# Patient Record
Sex: FEMALE | Race: WHITE | HISPANIC OR LATINO | Employment: UNEMPLOYED | URBAN - METROPOLITAN AREA
[De-identification: names, ages, dates, MRNs, and addresses within clinical notes are randomized per-mention and may not be internally consistent; named-entity substitution may affect disease eponyms.]

---

## 2023-09-29 PROBLEM — K44.9 HIATAL HERNIA: Status: ACTIVE | Noted: 2023-09-29

## 2023-09-29 PROBLEM — M25.561 KNEE PAIN, BILATERAL: Status: ACTIVE | Noted: 2023-09-29

## 2023-09-29 PROBLEM — R79.0 LOW FERRITIN: Status: ACTIVE | Noted: 2023-09-29

## 2023-09-29 PROBLEM — R07.81 RIB PAIN ON LEFT SIDE: Status: ACTIVE | Noted: 2023-09-29

## 2023-09-29 PROBLEM — H93.19 TINNITUS: Status: ACTIVE | Noted: 2023-09-29

## 2023-09-29 PROBLEM — J34.9 SINUS DISEASE: Status: ACTIVE | Noted: 2023-09-29

## 2023-09-29 PROBLEM — M54.16 LUMBAR RADICULOPATHY: Status: ACTIVE | Noted: 2023-09-29

## 2023-09-29 PROBLEM — K59.00 CONSTIPATION: Status: ACTIVE | Noted: 2023-09-29

## 2023-09-29 PROBLEM — T14.90XA TRAUMA: Status: ACTIVE | Noted: 2023-09-29

## 2023-09-29 PROBLEM — R76.8 HELICOBACTER PYLORI AB+: Status: ACTIVE | Noted: 2023-09-29

## 2023-09-29 PROBLEM — M54.50 LOW BACK PAIN: Status: ACTIVE | Noted: 2023-09-29

## 2023-09-29 PROBLEM — R73.9 HYPERGLYCEMIA: Status: ACTIVE | Noted: 2023-09-29

## 2023-09-29 PROBLEM — F41.8 MIXED ANXIETY DEPRESSIVE DISORDER: Status: ACTIVE | Noted: 2023-09-29

## 2023-09-29 PROBLEM — R90.89 ABNORMAL CT OF BRAIN: Status: ACTIVE | Noted: 2023-09-29

## 2023-09-29 PROBLEM — T78.40XA ALLERGIES: Status: ACTIVE | Noted: 2023-09-29

## 2023-09-29 PROBLEM — E53.8 VITAMIN B12 DEFICIENCY: Status: ACTIVE | Noted: 2023-09-29

## 2023-09-29 PROBLEM — R20.0 NUMBNESS AND TINGLING: Status: ACTIVE | Noted: 2023-09-29

## 2023-09-29 PROBLEM — R20.2 NUMBNESS AND TINGLING: Status: ACTIVE | Noted: 2023-09-29

## 2023-09-29 PROBLEM — M25.562 KNEE PAIN, BILATERAL: Status: ACTIVE | Noted: 2023-09-29

## 2023-09-29 PROBLEM — J45.909 ASTHMA (HHS-HCC): Status: ACTIVE | Noted: 2023-09-29

## 2023-09-29 PROBLEM — K52.9 COLITIS: Status: ACTIVE | Noted: 2023-09-29

## 2023-09-29 PROBLEM — Z86.16 HISTORY OF COVID-19: Status: ACTIVE | Noted: 2023-09-29

## 2023-09-29 PROBLEM — G43.909 MIGRAINES: Status: ACTIVE | Noted: 2023-09-29

## 2023-09-29 PROBLEM — R42 VERTIGO: Status: ACTIVE | Noted: 2023-09-29

## 2023-09-29 PROBLEM — M25.512 PAIN IN JOINT OF LEFT SHOULDER: Status: ACTIVE | Noted: 2023-09-29

## 2023-09-29 PROBLEM — C64.9 RENAL CELL CARCINOMA (MULTI): Status: ACTIVE | Noted: 2023-09-29

## 2023-09-29 PROBLEM — L65.9 HAIR LOSS: Status: ACTIVE | Noted: 2023-09-29

## 2023-09-29 RX ORDER — ALBUTEROL SULFATE 0.83 MG/ML
2.5 SOLUTION RESPIRATORY (INHALATION) EVERY 4 HOURS PRN
COMMUNITY
Start: 2022-03-31

## 2023-09-29 RX ORDER — CETIRIZINE HYDROCHLORIDE 10 MG/1
1 TABLET ORAL NIGHTLY PRN
COMMUNITY

## 2023-09-29 RX ORDER — PEN NEEDLE, DIABETIC 31 GX5/16"
NEEDLE, DISPOSABLE MISCELLANEOUS
COMMUNITY
Start: 2023-08-14 | End: 2024-05-13 | Stop reason: WASHOUT

## 2023-09-29 RX ORDER — IBUPROFEN 600 MG/1
1 TABLET ORAL EVERY 8 HOURS PRN
COMMUNITY
Start: 2022-03-31

## 2023-09-29 RX ORDER — CYCLOBENZAPRINE HCL 10 MG
1 TABLET ORAL NIGHTLY PRN
COMMUNITY

## 2023-09-29 RX ORDER — ALBUTEROL SULFATE 90 UG/1
2 AEROSOL, METERED RESPIRATORY (INHALATION) EVERY 4 HOURS PRN
COMMUNITY

## 2023-09-29 RX ORDER — ONDANSETRON 4 MG/1
1 TABLET, FILM COATED ORAL EVERY 8 HOURS PRN
COMMUNITY

## 2023-09-29 RX ORDER — CHOLECALCIFEROL (VITAMIN D3) 50 MCG
1 TABLET ORAL
COMMUNITY

## 2023-09-29 RX ORDER — POLYETHYLENE GLYCOL 3350 17 G/17G
17 POWDER, FOR SOLUTION ORAL
COMMUNITY
Start: 2022-12-07 | End: 2024-05-13 | Stop reason: WASHOUT

## 2023-10-02 ENCOUNTER — TELEMEDICINE (OUTPATIENT)
Dept: PRIMARY CARE | Facility: CLINIC | Age: 58
End: 2023-10-02
Payer: MEDICAID

## 2023-10-02 ENCOUNTER — TELEPHONE (OUTPATIENT)
Dept: PRIMARY CARE | Facility: CLINIC | Age: 58
End: 2023-10-02

## 2023-10-02 DIAGNOSIS — Z12.31 ENCOUNTER FOR SCREENING MAMMOGRAM FOR BREAST CANCER: ICD-10-CM

## 2023-10-02 DIAGNOSIS — Z12.31 SCREENING MAMMOGRAM FOR BREAST CANCER: ICD-10-CM

## 2023-10-02 DIAGNOSIS — I10 HIGH BLOOD PRESSURE DETERMINED BY EXAMINATION: Primary | ICD-10-CM

## 2023-10-02 PROCEDURE — 99214 OFFICE O/P EST MOD 30 MIN: CPT | Performed by: INTERNAL MEDICINE

## 2023-10-02 RX ORDER — GLUCOSAM/CHONDRO/HERB 149/HYAL 750-100 MG
1 TABLET ORAL DAILY
COMMUNITY
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
## 2023-10-03 ENCOUNTER — TELEPHONE (OUTPATIENT)
Dept: PRIMARY CARE | Facility: CLINIC | Age: 58
End: 2023-10-03
Payer: MEDICAID

## 2023-10-03 ENCOUNTER — ANCILLARY PROCEDURE (OUTPATIENT)
Dept: RADIOLOGY | Facility: CLINIC | Age: 58
End: 2023-10-03
Payer: MEDICAID

## 2023-10-03 DIAGNOSIS — M54.6 PAIN IN THORACIC SPINE: ICD-10-CM

## 2023-10-03 DIAGNOSIS — R61 NIGHT SWEATS: Primary | ICD-10-CM

## 2023-10-03 PROCEDURE — 72146 MRI CHEST SPINE W/O DYE: CPT

## 2023-10-03 PROCEDURE — 72146 MRI CHEST SPINE W/O DYE: CPT | Performed by: RADIOLOGY

## 2023-10-03 NOTE — TELEPHONE ENCOUNTER
"Pt called and states \"I forgot to ask Dr. Ann in my virtual appointment.  I wake up sweating every night, I get completely soaked.  I am wondering what this would be from and should I be concerned?\"  "

## 2023-10-04 ENCOUNTER — OFFICE VISIT (OUTPATIENT)
Dept: SURGERY | Facility: CLINIC | Age: 58
End: 2023-10-04
Payer: MEDICAID

## 2023-10-04 ENCOUNTER — TELEPHONE (OUTPATIENT)
Dept: PRIMARY CARE | Facility: CLINIC | Age: 58
End: 2023-10-04

## 2023-10-04 DIAGNOSIS — R59.9 LYMPH NODE ENLARGEMENT: ICD-10-CM

## 2023-10-04 DIAGNOSIS — L04.9 LYMPH NODE ABSCESS: Primary | ICD-10-CM

## 2023-10-04 PROCEDURE — 1036F TOBACCO NON-USER: CPT | Performed by: PHYSICIAN ASSISTANT

## 2023-10-04 PROCEDURE — 99203 OFFICE O/P NEW LOW 30 MIN: CPT | Performed by: PHYSICIAN ASSISTANT

## 2023-10-04 NOTE — PROGRESS NOTES
Subjective   Patient ID: Tyra Hogan is a 58 y.o. female who presents for knot on neck.  HPI 58-year-old female with a recent diagnosis couple of months ago with renal cell cancer.  At [facility].  She saw her primary care doctor for this knot on the back of the neck and they told her it was a lipoma and they sent her here to have it extracted.  She states its been there for she is not sure how long she has been putting castor oil on it she thinks it is gotten smaller.  She has a an appointment with her urology person next week to discuss surgical options for her renal cell cancer.  She is not seen an oncologist yet.  But there is just a solitary posterior neck not according to the patient and it does not hurt.    Review of Systems  Negative other than mentioned in HPI    ENT: No earache, no sore throat, no nosebleeds  Cardiovascular: No chest pain, no shortness of breath, no leg pain, no edema  Respiratory: No shortness of breath on exertion, no wheezing  Gastrointestinal: No abdominal pain, no melena, no nausea, vomiting and/or diarrhea  Musculoskeletal: No pain moving all extremities, no back pain ambulating normally  Skin: No rashes, no lesions, and no skin changes  Neuro: No headache, no confusion, no numbness and tingling  Psychiatric, normal mood, not suicidal, not homicidal, feeling good    Objective There were no vitals taken for this visit.    Physical Exam  Eyes: Conjunctiva non -icteric and eye lids are without obvious rash or drooping. Pupils are symmetric.   Ears, Nose, Mouth, and Throat: External ears and nose appear to be without deformity or rash. No lesions or masses noted. Hearing is grossly intact.   Neck:. No JVD noted, tracheal position is midline. No thyromegaly, no thyroid nodules, there is 1 most likely posterior cervical chain lymph node present soft movable.  It is only about 1 cm  Head and Face: Examination of the head and face revealed no abnormalities.   Respiratory: No gasping or  shortness of breath noted, no use of accessory muscles noted. Clear to auscultate bilaterally  Cardiovascular: Examination for edema is normal. Regular rate and rhythm S1 S2 without murmurs  GI: Abdomen no tender to palpation, bowel sounds present no hepatosplenomegaly  Skin: No anterior cervical chain lymph nodes, no supra or infra clavicular lymph nodes.  No axillary adenopathy present.  Just the one small posterior cervical chain lymph node on the left side.  Musk: Digits/nails show no clubbing or cyanosis. No asymmetry or masses noted of the musculature. Examination of the muscles/joints/bones show normal range of motion. Gait is grossly normally.   Neurologic: Cranial nerves II- XII intact, motor strength 5/5 muscle strength of the lower extremities bilaterally and equal.      Assessment/Plan   58-year-old female with most likely a posterior cervical chain lymph node present its only about 1 cm small is movable.  Patient was encouraged just to watch it.  She should also report this to her urologist who is covering her renal cell cancer.  If it gets larger or becomes painful please return to the office.

## 2023-10-20 LAB
NON-UH HIE A/G RATIO: 1.5
NON-UH HIE ALB: 4 G/DL (ref 3.4–5)
NON-UH HIE ALK PHOS: 73 UNIT/L (ref 45–117)
NON-UH HIE BILIRUBIN, TOTAL: 0.8 MG/DL (ref 0.3–1.2)
NON-UH HIE BUN/CREAT RATIO: 20
NON-UH HIE BUN: 12 MG/DL (ref 9–23)
NON-UH HIE CALCIUM: 9.3 MG/DL (ref 8.7–10.4)
NON-UH HIE CALCULATED OSMOLALITY: 279 MOSM/KG (ref 275–295)
NON-UH HIE CHLORIDE: 105 MMOL/L (ref 98–107)
NON-UH HIE CO2, VENOUS: 31 MMOL/L (ref 20–31)
NON-UH HIE CREATININE: 0.6 MG/DL (ref 0.5–0.8)
NON-UH HIE FREE T3: 2.7 PG/ML (ref 2.3–4.2)
NON-UH HIE FREE T4: 1 NG/DL (ref 0.89–1.76)
NON-UH HIE GFR AA: >60
NON-UH HIE GLOBULIN: 2.6 G/DL
NON-UH HIE GLOMERULAR FILTRATION RATE: >60 ML/MIN/1.73M?
NON-UH HIE GLUCOSE: 89 MG/DL (ref 74–106)
NON-UH HIE GOT: <8 UNIT/L (ref 15–37)
NON-UH HIE GPT: 8 UNIT/L (ref 10–49)
NON-UH HIE K: 4 MMOL/L (ref 3.5–5.1)
NON-UH HIE NA: 140 MMOL/L (ref 135–145)
NON-UH HIE TOTAL PROTEIN: 6.6 G/DL (ref 5.7–8.2)
NON-UH HIE TSH: 2.22 UIU/ML (ref 0.55–4.78)

## 2023-10-31 ENCOUNTER — OFFICE VISIT (OUTPATIENT)
Dept: PRIMARY CARE | Facility: CLINIC | Age: 58
End: 2023-10-31
Payer: MEDICAID

## 2023-10-31 VITALS
DIASTOLIC BLOOD PRESSURE: 88 MMHG | OXYGEN SATURATION: 98 % | HEART RATE: 80 BPM | SYSTOLIC BLOOD PRESSURE: 120 MMHG | TEMPERATURE: 97.8 F | WEIGHT: 130 LBS | BODY MASS INDEX: 21.66 KG/M2 | HEIGHT: 65 IN

## 2023-10-31 DIAGNOSIS — C64.1 RENAL CELL CARCINOMA OF RIGHT KIDNEY (MULTI): Primary | ICD-10-CM

## 2023-10-31 PROBLEM — Z86.16 HISTORY OF COVID-19: Status: RESOLVED | Noted: 2023-09-29 | Resolved: 2023-10-31

## 2023-10-31 PROBLEM — I10 HIGH BLOOD PRESSURE DETERMINED BY EXAMINATION: Status: RESOLVED | Noted: 2023-10-02 | Resolved: 2023-10-31

## 2023-10-31 PROBLEM — K52.9 COLITIS: Status: RESOLVED | Noted: 2023-09-29 | Resolved: 2023-10-31

## 2023-10-31 PROCEDURE — 99214 OFFICE O/P EST MOD 30 MIN: CPT | Performed by: INTERNAL MEDICINE

## 2023-10-31 PROCEDURE — 1036F TOBACCO NON-USER: CPT | Performed by: INTERNAL MEDICINE

## 2023-10-31 ASSESSMENT — PATIENT HEALTH QUESTIONNAIRE - PHQ9
1. LITTLE INTEREST OR PLEASURE IN DOING THINGS: NOT AT ALL
SUM OF ALL RESPONSES TO PHQ9 QUESTIONS 1 & 2: 0
2. FEELING DOWN, DEPRESSED OR HOPELESS: NOT AT ALL

## 2023-10-31 ASSESSMENT — PAIN SCALES - GENERAL: PAINLEVEL: 0-NO PAIN

## 2023-10-31 NOTE — PROGRESS NOTES
"Subjective   Patient ID: Tyra Hogan is a 58 y.o. female who presents for Follow-up (Pt here for FUV to discuss results of MRI and lab results.  Pt would like to discuss her blood pressure and vision disturbances.).    HPI   Last visit 10/2023 she was concerned about bp  Now changes in vision. Intermittent. Last ophtho 1 month ago but was not having vision symptoms when she saw ophtho.   Few years ago diagnosed with ocular migraines  Having numbness right side of face  Bilateral feet are cold. Numbness.   Also numbness of arms.    Left sided pain resolved after doing colon prep. Had a lot of mucous when she did the prep. Still pain right side.  Has appt with urologist tomorrow. She will discuss CT chest with them tomorrow.  Friends told patient she was cured and that she should not have the surgery. The told her that if she believed in God she would wait a few more months so that she could see she was cured. She has not talked to these people for the last month. Her children have been encouraging her to proceed with surgery.    Chronic numbness tingling right leg.       Objective   /88 (BP Location: Right arm, Patient Position: Sitting)   Pulse 80   Temp 36.6 °C (97.8 °F)   Ht 1.651 m (5' 5\")   Wt 59 kg (130 lb)   SpO2 98%   BMI 21.63 kg/m²     Physical Exam  /70 on repeat regular cuff sitting  NAD    Vital reviewed  Neck: no cervical/clavicular adenopathy  CV: RRR S1 S2 normal. No murmur  Lungs: CTA without wrr. Breath sounds symmetric  Abdomen: normoactive. Soft, nontender. No mass.  Extremities: no pretibial edema  Neuro: speech intact.     Labs 10/2023 cmp, tsh  Tsh 2.22 normal  Cr 0.6 glucose 89 K 4    MRI thoracic spine midline disc herniation C6/7 with flattening thecal sac    Assessment/Plan .     Renal cancer: long discussion with patient. She is planning to have surgery end of this week.    Anxiety/depression. Has good support from children  Labs up to date.   "

## 2023-11-06 ENCOUNTER — TELEPHONE (OUTPATIENT)
Dept: PRIMARY CARE | Facility: CLINIC | Age: 58
End: 2023-11-06
Payer: MEDICAID

## 2023-11-06 NOTE — TELEPHONE ENCOUNTER
----- Message from Katie Chapa MD sent at 11/3/2023 10:06 AM EDT -----  Please let patient know mammogram is normal.

## 2023-11-20 ENCOUNTER — TELEPHONE (OUTPATIENT)
Dept: PRIMARY CARE | Facility: CLINIC | Age: 58
End: 2023-11-20
Payer: MEDICAID

## 2023-11-20 NOTE — LETTER
November 21, 2023     Tyra Hogan  [address]  [facility] [address]    Patient: Tyra Hogan   YOB: 1965   Date of Visit: 11/20/2023       Dear Ritika:    Tyra Hogan  is my patient, and has been under my care. I am intimately familiar with history and with the functional limitations imposed by her disability.  She meets the definition of disability under the Americans with Disabilities Act, the Fair Housing Act, and the Rehabilitation Act of 1973.    Due to their medical/mental disability, Tyra Hogan has certain limitations regarding adjustment disorder, anxiety and depression.   In order to help alleviate these difficulties, and to enhance her ability to live independently and to fully use and enjoy the dwelling you own and/or administer, I am prescribing an emotional support animal that will assist Tyra Hogan in coping with her disability.    I am familiar with the voluminous professional literature concerning the therapeutic benefits of assistance animals for people with disabilities such as that experienced by Tyra Hogan. Upon request, I will share citations to relevant studies, and would be happy to answer other questions you may have concerning my recommendation the Tyra Hogan have an emotional support animal. Should you have any additional questions, please do not hesitate to contact my office.    Sincerely,     Katie Ann MD   ______________________________________________________________________________________

## 2023-11-20 NOTE — TELEPHONE ENCOUNTER
"Pt left message \"I recently moved and I need a letter from Dr. Ann stating that I need my dog for emotional support.  I need it by the end of this week.\"  "

## 2023-11-27 ENCOUNTER — OFFICE VISIT (OUTPATIENT)
Dept: NEUROSURGERY | Facility: CLINIC | Age: 58
End: 2023-11-27
Payer: MEDICAID

## 2023-11-27 VITALS
WEIGHT: 130 LBS | TEMPERATURE: 96.9 F | HEIGHT: 65 IN | HEART RATE: 69 BPM | SYSTOLIC BLOOD PRESSURE: 114 MMHG | BODY MASS INDEX: 21.66 KG/M2 | DIASTOLIC BLOOD PRESSURE: 80 MMHG

## 2023-11-27 DIAGNOSIS — M54.12 CERVICAL RADICULOPATHY: Primary | ICD-10-CM

## 2023-11-27 PROCEDURE — 1036F TOBACCO NON-USER: CPT | Performed by: STUDENT IN AN ORGANIZED HEALTH CARE EDUCATION/TRAINING PROGRAM

## 2023-11-27 PROCEDURE — 99203 OFFICE O/P NEW LOW 30 MIN: CPT | Performed by: STUDENT IN AN ORGANIZED HEALTH CARE EDUCATION/TRAINING PROGRAM

## 2023-11-27 ASSESSMENT — PATIENT HEALTH QUESTIONNAIRE - PHQ9
1. LITTLE INTEREST OR PLEASURE IN DOING THINGS: NOT AT ALL
2. FEELING DOWN, DEPRESSED OR HOPELESS: NOT AT ALL
SUM OF ALL RESPONSES TO PHQ9 QUESTIONS 1 & 2: 0

## 2023-11-27 ASSESSMENT — LIFESTYLE VARIABLES
AUDIT-C TOTAL SCORE: 1
HOW MANY STANDARD DRINKS CONTAINING ALCOHOL DO YOU HAVE ON A TYPICAL DAY: 1 OR 2
HOW OFTEN DO YOU HAVE A DRINK CONTAINING ALCOHOL: MONTHLY OR LESS
HOW OFTEN DO YOU HAVE SIX OR MORE DRINKS ON ONE OCCASION: NEVER
SKIP TO QUESTIONS 9-10: 1

## 2023-11-27 ASSESSMENT — PAIN SCALES - GENERAL: PAINLEVEL: 0-NO PAIN

## 2023-11-27 NOTE — PROGRESS NOTES
[facility]  Department of Neurological Surgery  New Patient Visit    History of Present Illness:  Tyra Hogan is a 58 y.o. year old female who presents to the spine clinic with paraesthesias in arms, neck pain, and right sided scapular pain. The pain has been going on for months and has been worsening. Her symptoms are brought on with activity. She saw a naturopathic doctor who prescribed her anti-inflammatories. She saw improvement in her symptoms after taking medication. She now only has some intermittent pain in her neck, otherwise she is in good spirits today in the office. She has never had neck surgery. She has not seen pain management. She has done physical therapy.    Prior Spine Surgeries: None    Physical Therapy: Yes  Diabetic: No   Osteoporosis: No  Patient's BMI is Body mass index is 21.63 kg/m².    Review of Systems:   systems reviewed and negative other than what is listed in the history of present illness    Patient Active Problem List   Diagnosis    Abnormal CT of brain    Allergies    Asthma    Constipation    Hair loss    Helicobacter pylori ab+    Hiatal hernia    Hyperglycemia    Knee pain, bilateral    Low back pain    Low ferritin    Lumbar radiculopathy    Migraines    Tinnitus    Numbness and tingling    Vertigo    Sinus disease    Mixed anxiety depressive disorder    Pain in joint of left shoulder    Rib pain on left side    Trauma    Renal cell carcinoma (CMS/HCC)    Vitamin B12 deficiency     No past medical history on file.  Past Surgical History:   Procedure Laterality Date    APPENDECTOMY  2003     SECTION, LOW TRANSVERSE  3/2/1995    COLOSTOMY  2023    OTHER SURGICAL HISTORY  2021    Appendectomy    OTHER SURGICAL HISTORY  2021     section    OTHER SURGICAL HISTORY  2021    Tonsillectomy     Social History     Tobacco Use    Smoking status: Former     Packs/day: 0.25     Years: 15.00     Additional pack years:  0.00     Total pack years: 3.75     Types: Cigarettes     Quit date: 1994     Years since quittin.3    Smokeless tobacco: Never   Substance Use Topics    Alcohol use: Not Currently     family history includes Diabetes in her mother; Hypertension in her mother; Other in her father; cardiac disorder in her mother; cerebral vascular accident in her sister.    Current Outpatient Medications:     cholecalciferol (Vitamin D-3) 50 MCG (2000 UT) tablet, Take 1 tablet (2,000 Units) by mouth once daily., Disp: , Rfl:     mecobalamin (B12 ACTIVE ORAL), Take 1 tablet by mouth once daily., Disp: , Rfl:     albuterol (Ventolin HFA) 90 mcg/actuation inhaler, Inhale 2 puffs every 4 hours if needed for shortness of breath., Disp: , Rfl:     albuterol 2.5 mg /3 mL (0.083 %) nebulizer solution, Inhale 3 mL (2.5 mg) every 4 hours if needed for wheezing., Disp: , Rfl:     Bacillus coagulans (PROBIOTIC, B. COAGULANS, ORAL), Take 1 capsule by mouth if needed., Disp: , Rfl:     cetirizine (ZyrTEC) 10 mg tablet, Take 1 tablet (10 mg) by mouth as needed at bedtime., Disp: , Rfl:     cyclobenzaprine (Flexeril) 10 mg tablet, Take 1 tablet (10 mg) by mouth as needed at bedtime., Disp: , Rfl:     ibuprofen 600 mg tablet, Take 1 tablet (600 mg) by mouth every 8 hours if needed for headaches., Disp: , Rfl:     nebulizers misc, Use as directed, Disp: , Rfl:     omega 3-dha-epa-fish oil (Fish OiL) 1,000 mg (120 mg-180 mg) capsule, Take 1 capsule (1,000 mg) by mouth once daily., Disp: , Rfl:     ondansetron (Zofran) 4 mg tablet, Take 1 tablet (4 mg) by mouth every 8 hours if needed., Disp: , Rfl:     polyethylene glycol (Glycolax, Miralax) 17 gram/dose powder, Take 17 g by mouth. 1-2 times daily, Disp: , Rfl:     SHARK CARTILAGE ORAL, Take by mouth., Disp: , Rfl:     FAIZAN'S WORT ORAL, Take by mouth., Disp: , Rfl:   Allergies   Allergen Reactions    Gabapentin Anxiety, Palpitations, Shortness of breath and Other       Physical  Examination    General: Well developed, awake/alert/oriented x3, no distress, alert and cooperative  Skin: Warm and dry, no lesions, no rashes  ENMT: Mucous membranes moist, no apparent injury, no lesions seen  Head/Neck: Neck Supple, no apparent injury  Respiratory/Thorax: Normal breath sounds with good chest expansion, thorax symmetric  Cardiovascular: No pitting edema, no JVD    Motor Strength: 5/5 Throughout all extremities    Muscle Bulk: Normal and symmetric in all extremities    Posture:   -- Cervical: Normal  -- Thoracic: Normal  -- Lumbar : Normal  Paraspinal muscle spasm/tenderness absent.     Sensation: intact to light touch    Results    I personally reviewed and interpreted the imaging results which included MRI of thoracic spine showing C6-7 disc herniation causing moderate spinal stenosis.    Assessment and Plan:    Tyra Hogan is a 58 y.o. year old female who presents to the spine clinic with cervical radiculopathy. Her symptoms are well-controlled with medication and conservative care. She will continue with conservative management. Follow up as needed.      I have reviewed all prior documentation and reviewed the electronic medical record since admission. I have personally have reviewed all advanced imaging not just the reports and used my interpretation as documented as the relevant findings. I have reviewed the risks and benefits of all treatment recommendations listed in this note with the patient and family. I spent a total of 35 minutes in service to this patient's care during this date of service.    The above clinical summary has been dictated with voice recognition software. It has not been proofread for grammatical errors, typographical mistakes, or other semantic inconsistencies.    Thank you for visiting our office today. It was our pleasure to take part in your healthcare.     Do not hesitate to call with any questions regarding your plan of care after leaving at [phone] M-F  8am-4pm.     To clinicians, thank you very much for this kind referral. It is a privilege to partner with you in the care of your patients. My office would be delighted to assist you with any further consultations or with questions regarding the plan of care outlined. Do not hesitate to call the office or contact me directly.       Sincerely,      Robbie Veras MD  Director, [facility]   of Neurosurgery, [facility] and [facility]  Complex Spine Fellowship Director  , Department of Neurological Surgery  [employer] [facility] [employer]    [facility]  [address]  [place] [address]    [facility]  [address]  [address]  [place] [address]    Phone: [phone]  Fax: [phone]        Scribe Attestation  By signing my name below, I, Gissel Melissa , Scribe   attest that this documentation has been prepared under the direction and in the presence of Robbie Veras MD.

## 2024-05-09 PROBLEM — J01.90 ACUTE SINUSITIS: Status: ACTIVE | Noted: 2023-07-30

## 2024-05-09 PROBLEM — M51.34 THORACIC DEGENERATIVE DISC DISEASE: Status: ACTIVE | Noted: 2024-05-09

## 2024-05-09 PROBLEM — M62.81 MUSCLE WEAKNESS: Status: ACTIVE | Noted: 2022-10-05

## 2024-05-09 PROBLEM — M25.562 BILATERAL CHRONIC KNEE PAIN: Status: ACTIVE | Noted: 2022-10-05

## 2024-05-09 PROBLEM — M25.561 BILATERAL CHRONIC KNEE PAIN: Status: ACTIVE | Noted: 2022-10-05

## 2024-05-09 PROBLEM — R30.0 DYSURIA: Status: ACTIVE | Noted: 2023-07-30

## 2024-05-09 PROBLEM — R26.89 BALANCE PROBLEM: Status: ACTIVE | Noted: 2022-10-05

## 2024-05-09 PROBLEM — G89.29 BILATERAL CHRONIC KNEE PAIN: Status: ACTIVE | Noted: 2022-10-05

## 2024-05-12 PROBLEM — J01.90 ACUTE SINUSITIS: Status: RESOLVED | Noted: 2023-07-30 | Resolved: 2024-05-12

## 2024-05-12 PROBLEM — J34.9 SINUS DISEASE: Status: RESOLVED | Noted: 2023-09-29 | Resolved: 2024-05-12

## 2024-05-13 ENCOUNTER — OFFICE VISIT (OUTPATIENT)
Dept: PRIMARY CARE | Facility: CLINIC | Age: 59
End: 2024-05-13
Payer: MEDICAID

## 2024-05-13 ENCOUNTER — CLINICAL SUPPORT (OUTPATIENT)
Dept: ORTHOPEDIC SURGERY | Facility: CLINIC | Age: 59
End: 2024-05-13
Payer: MEDICAID

## 2024-05-13 ENCOUNTER — HOSPITAL ENCOUNTER (OUTPATIENT)
Dept: RADIOLOGY | Facility: CLINIC | Age: 59
Discharge: HOME | End: 2024-05-13
Payer: MEDICAID

## 2024-05-13 ENCOUNTER — LAB (OUTPATIENT)
Dept: LAB | Facility: LAB | Age: 59
End: 2024-05-13
Payer: MEDICAID

## 2024-05-13 VITALS
HEART RATE: 74 BPM | SYSTOLIC BLOOD PRESSURE: 110 MMHG | DIASTOLIC BLOOD PRESSURE: 78 MMHG | BODY MASS INDEX: 23.09 KG/M2 | WEIGHT: 138.6 LBS | OXYGEN SATURATION: 97 % | HEIGHT: 65 IN | TEMPERATURE: 98.2 F

## 2024-05-13 DIAGNOSIS — C64.9 RENAL CELL CARCINOMA, UNSPECIFIED LATERALITY (MULTI): ICD-10-CM

## 2024-05-13 DIAGNOSIS — M54.2 NECK PAIN: Primary | ICD-10-CM

## 2024-05-13 DIAGNOSIS — E53.8 VITAMIN B12 DEFICIENCY: ICD-10-CM

## 2024-05-13 DIAGNOSIS — M79.10 MUSCLE PAIN: ICD-10-CM

## 2024-05-13 DIAGNOSIS — M54.9 BACK PAIN, UNSPECIFIED BACK LOCATION, UNSPECIFIED BACK PAIN LATERALITY, UNSPECIFIED CHRONICITY: ICD-10-CM

## 2024-05-13 DIAGNOSIS — R32 URINARY INCONTINENCE, UNSPECIFIED TYPE: ICD-10-CM

## 2024-05-13 DIAGNOSIS — R53.83 OTHER FATIGUE: ICD-10-CM

## 2024-05-13 DIAGNOSIS — G47.8 UNREFRESHED BY SLEEP: ICD-10-CM

## 2024-05-13 DIAGNOSIS — M54.2 NECK PAIN: ICD-10-CM

## 2024-05-13 PROBLEM — L65.9 HAIR LOSS: Status: RESOLVED | Noted: 2023-09-29 | Resolved: 2024-05-13

## 2024-05-13 LAB
25(OH)D3 SERPL-MCNC: 70 NG/ML (ref 30–100)
ALBUMIN SERPL BCP-MCNC: 4.2 G/DL (ref 3.4–5)
ALP SERPL-CCNC: 60 U/L (ref 33–110)
ALT SERPL W P-5'-P-CCNC: 11 U/L (ref 7–45)
ANION GAP SERPL CALC-SCNC: 8 MMOL/L (ref 10–20)
APPEARANCE UR: CLEAR
AST SERPL W P-5'-P-CCNC: 9 U/L (ref 9–39)
BASOPHILS # BLD AUTO: 0.02 X10*3/UL (ref 0–0.1)
BASOPHILS NFR BLD AUTO: 0.4 %
BILIRUB SERPL-MCNC: 0.7 MG/DL (ref 0–1.2)
BILIRUB UR STRIP.AUTO-MCNC: NEGATIVE MG/DL
BUN SERPL-MCNC: 15 MG/DL (ref 6–23)
CALCIUM SERPL-MCNC: 9.3 MG/DL (ref 8.6–10.3)
CHLORIDE SERPL-SCNC: 104 MMOL/L (ref 98–107)
CO2 SERPL-SCNC: 32 MMOL/L (ref 21–32)
COLOR UR: YELLOW
CREAT SERPL-MCNC: 0.55 MG/DL (ref 0.5–1.05)
EGFRCR SERPLBLD CKD-EPI 2021: >90 ML/MIN/1.73M*2
EOSINOPHIL # BLD AUTO: 0.07 X10*3/UL (ref 0–0.7)
EOSINOPHIL NFR BLD AUTO: 1.4 %
ERYTHROCYTE [DISTWIDTH] IN BLOOD BY AUTOMATED COUNT: 12.6 % (ref 11.5–14.5)
FERRITIN SERPL-MCNC: 78 NG/ML (ref 8–150)
GLUCOSE SERPL-MCNC: 83 MG/DL (ref 74–99)
GLUCOSE UR STRIP.AUTO-MCNC: NEGATIVE MG/DL
HCT VFR BLD AUTO: 42.6 % (ref 36–46)
HGB BLD-MCNC: 14.6 G/DL (ref 12–16)
IMM GRANULOCYTES # BLD AUTO: 0.01 X10*3/UL (ref 0–0.7)
IMM GRANULOCYTES NFR BLD AUTO: 0.2 % (ref 0–0.9)
IRON SATN MFR SERPL: 28 % (ref 25–45)
IRON SERPL-MCNC: 93 UG/DL (ref 35–150)
KETONES UR STRIP.AUTO-MCNC: NEGATIVE MG/DL
LEUKOCYTE ESTERASE UR QL STRIP.AUTO: NEGATIVE
LYMPHOCYTES # BLD AUTO: 1.26 X10*3/UL (ref 1.2–4.8)
LYMPHOCYTES NFR BLD AUTO: 25.1 %
MCH RBC QN AUTO: 32.3 PG (ref 26–34)
MCHC RBC AUTO-ENTMCNC: 34.3 G/DL (ref 32–36)
MCV RBC AUTO: 94 FL (ref 80–100)
MONOCYTES # BLD AUTO: 0.32 X10*3/UL (ref 0.1–1)
MONOCYTES NFR BLD AUTO: 6.4 %
NEUTROPHILS # BLD AUTO: 3.34 X10*3/UL (ref 1.2–7.7)
NEUTROPHILS NFR BLD AUTO: 66.5 %
NITRITE UR QL STRIP.AUTO: NEGATIVE
NRBC BLD-RTO: 0 /100 WBCS (ref 0–0)
PH UR STRIP.AUTO: 5 [PH]
PLATELET # BLD AUTO: 154 X10*3/UL (ref 150–450)
POTASSIUM SERPL-SCNC: 4 MMOL/L (ref 3.5–5.3)
PROT SERPL-MCNC: 6.5 G/DL (ref 6.4–8.2)
PROT UR STRIP.AUTO-MCNC: NEGATIVE MG/DL
RBC # BLD AUTO: 4.52 X10*6/UL (ref 4–5.2)
RBC # UR STRIP.AUTO: NEGATIVE /UL
SODIUM SERPL-SCNC: 140 MMOL/L (ref 136–145)
SP GR UR STRIP.AUTO: 1.01
TIBC SERPL-MCNC: 331 UG/DL (ref 240–445)
TSH SERPL-ACNC: 2.27 MIU/L (ref 0.44–3.98)
UIBC SERPL-MCNC: 238 UG/DL (ref 110–370)
UROBILINOGEN UR STRIP.AUTO-MCNC: <2 MG/DL
VIT B12 SERPL-MCNC: 966 PG/ML (ref 211–911)
WBC # BLD AUTO: 5 X10*3/UL (ref 4.4–11.3)

## 2024-05-13 PROCEDURE — 86038 ANTINUCLEAR ANTIBODIES: CPT

## 2024-05-13 PROCEDURE — 99214 OFFICE O/P EST MOD 30 MIN: CPT | Performed by: INTERNAL MEDICINE

## 2024-05-13 PROCEDURE — 72050 X-RAY EXAM NECK SPINE 4/5VWS: CPT | Performed by: RADIOLOGY

## 2024-05-13 PROCEDURE — 82728 ASSAY OF FERRITIN: CPT

## 2024-05-13 PROCEDURE — 84443 ASSAY THYROID STIM HORMONE: CPT

## 2024-05-13 PROCEDURE — 85025 COMPLETE CBC W/AUTO DIFF WBC: CPT

## 2024-05-13 PROCEDURE — 36415 COLL VENOUS BLD VENIPUNCTURE: CPT

## 2024-05-13 PROCEDURE — 81003 URINALYSIS AUTO W/O SCOPE: CPT

## 2024-05-13 PROCEDURE — 72110 X-RAY EXAM L-2 SPINE 4/>VWS: CPT

## 2024-05-13 PROCEDURE — 82306 VITAMIN D 25 HYDROXY: CPT

## 2024-05-13 PROCEDURE — 72072 X-RAY EXAM THORAC SPINE 3VWS: CPT

## 2024-05-13 PROCEDURE — 72110 X-RAY EXAM L-2 SPINE 4/>VWS: CPT | Performed by: RADIOLOGY

## 2024-05-13 PROCEDURE — 83550 IRON BINDING TEST: CPT

## 2024-05-13 PROCEDURE — 72050 X-RAY EXAM NECK SPINE 4/5VWS: CPT

## 2024-05-13 PROCEDURE — 1036F TOBACCO NON-USER: CPT | Performed by: INTERNAL MEDICINE

## 2024-05-13 PROCEDURE — 80053 COMPREHEN METABOLIC PANEL: CPT

## 2024-05-13 PROCEDURE — 72072 X-RAY EXAM THORAC SPINE 3VWS: CPT | Performed by: RADIOLOGY

## 2024-05-13 PROCEDURE — 83540 ASSAY OF IRON: CPT

## 2024-05-13 PROCEDURE — 82607 VITAMIN B-12: CPT

## 2024-05-13 RX ORDER — ASCORBIC ACID 250 MG
250 TABLET ORAL DAILY
COMMUNITY

## 2024-05-13 ASSESSMENT — PAIN SCALES - GENERAL: PAINLEVEL: 2
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
## 2024-05-14 ENCOUNTER — TELEPHONE (OUTPATIENT)
Dept: CARDIOLOGY | Facility: CLINIC | Age: 59
End: 2024-05-14
Payer: MEDICAID

## 2024-05-14 NOTE — TELEPHONE ENCOUNTER
Spoke to Lucinda at [facility] precert regarding Pet Scan [id number] DX: C64.9. She started a case on this patient with [facility] and she will be submitting clinical documentation through [employer].  When I called her she said that the facility does precert for the Pet Scans now. The pending case number that I was given from [facility] regarding this patient is [id number], spoke to Azeb BAH

## 2024-05-18 LAB — ANA SER QL HEP2 SUBST: NEGATIVE

## 2024-05-22 ENCOUNTER — CLINICAL SUPPORT (OUTPATIENT)
Dept: SLEEP MEDICINE | Facility: HOSPITAL | Age: 59
End: 2024-05-22
Payer: MEDICAID

## 2024-05-22 DIAGNOSIS — G47.8 UNREFRESHED BY SLEEP: ICD-10-CM

## 2024-05-22 DIAGNOSIS — R53.83 OTHER FATIGUE: ICD-10-CM

## 2024-05-22 PROCEDURE — 95806 SLEEP STUDY UNATT&RESP EFFT: CPT | Performed by: INTERNAL MEDICINE

## 2024-05-30 DIAGNOSIS — G47.30 SLEEP APNEA, UNSPECIFIED TYPE: Primary | ICD-10-CM

## 2024-06-13 ENCOUNTER — OFFICE VISIT (OUTPATIENT)
Dept: SLEEP MEDICINE | Facility: HOSPITAL | Age: 59
End: 2024-06-13
Payer: MEDICAID

## 2024-06-13 VITALS
OXYGEN SATURATION: 98 % | SYSTOLIC BLOOD PRESSURE: 111 MMHG | HEART RATE: 83 BPM | BODY MASS INDEX: 22.47 KG/M2 | DIASTOLIC BLOOD PRESSURE: 74 MMHG | TEMPERATURE: 97.3 F | WEIGHT: 135 LBS

## 2024-06-13 DIAGNOSIS — G47.30 SLEEP APNEA, UNSPECIFIED TYPE: ICD-10-CM

## 2024-06-13 DIAGNOSIS — G47.00 INSOMNIA, UNSPECIFIED TYPE: ICD-10-CM

## 2024-06-13 DIAGNOSIS — G47.8 SLEEP PARALYSIS: ICD-10-CM

## 2024-06-13 DIAGNOSIS — G47.33 OSA (OBSTRUCTIVE SLEEP APNEA): Primary | ICD-10-CM

## 2024-06-13 PROCEDURE — 99204 OFFICE O/P NEW MOD 45 MIN: CPT | Performed by: STUDENT IN AN ORGANIZED HEALTH CARE EDUCATION/TRAINING PROGRAM

## 2024-06-13 PROCEDURE — 99214 OFFICE O/P EST MOD 30 MIN: CPT | Performed by: STUDENT IN AN ORGANIZED HEALTH CARE EDUCATION/TRAINING PROGRAM

## 2024-06-13 PROCEDURE — 1036F TOBACCO NON-USER: CPT | Performed by: STUDENT IN AN ORGANIZED HEALTH CARE EDUCATION/TRAINING PROGRAM

## 2024-06-13 SDOH — ECONOMIC STABILITY: FOOD INSECURITY: WITHIN THE PAST 12 MONTHS, THE FOOD YOU BOUGHT JUST DIDN'T LAST AND YOU DIDN'T HAVE MONEY TO GET MORE.: OFTEN TRUE

## 2024-06-13 SDOH — ECONOMIC STABILITY: FOOD INSECURITY: WITHIN THE PAST 12 MONTHS, YOU WORRIED THAT YOUR FOOD WOULD RUN OUT BEFORE YOU GOT MONEY TO BUY MORE.: OFTEN TRUE

## 2024-06-13 ASSESSMENT — SLEEP AND FATIGUE QUESTIONNAIRES
HOW LIKELY ARE YOU TO NOD OFF OR FALL ASLEEP WHILE WATCHING TV: MODERATE CHANCE OF DOZING
HOW LIKELY ARE YOU TO NOD OFF OR FALL ASLEEP WHILE SITTING QUIETLY AFTER LUNCH WITHOUT ALCOHOL: WOULD NEVER DOZE
SATISFACTION_WITH_CURRENT_SLEEP_PATTERN: SATISFIED
HOW LIKELY ARE YOU TO NOD OFF OR FALL ASLEEP WHILE SITTING AND TALKING TO SOMEONE: WOULD NEVER DOZE
HOW LIKELY ARE YOU TO NOD OFF OR FALL ASLEEP IN A CAR, WHILE STOPPED FOR A FEW MINUTES IN TRAFFIC: WOULD NEVER DOZE
SITING INACTIVE IN A PUBLIC PLACE LIKE A CLASS ROOM OR A MOVIE THEATER: SLIGHT CHANCE OF DOZING
WORRIED_DISTRESSED_DUE_TO_SLEEP: MUCH
DIFFICULTY_STAYING_ASLEEP: VERY SEVERE
HOW LIKELY ARE YOU TO NOD OFF OR FALL ASLEEP WHEN YOU ARE A PASSENGER IN A CAR FOR AN HOUR WITHOUT A BREAK: WOULD NEVER DOZE
DIFFICULTY_FALLING_ASLEEP: VERY SEVERE
WAKING_TOO_EARLY: MODERATE
SLEEP_PROBLEM_INTERFERES_DAILY_ACTIVITIES: VERY MUCH NOTICEABLE
SLEEP_PROBLEM_NOTICEABLE_TO_OTHERS: VERY MUCH NOTICEABLE
ESS-CHAD TOTAL SCORE: 6
HOW LIKELY ARE YOU TO NOD OFF OR FALL ASLEEP WHILE LYING DOWN TO REST IN THE AFTERNOON WHEN CIRCUMSTANCES PERMIT: SLIGHT CHANCE OF DOZING
HOW LIKELY ARE YOU TO NOD OFF OR FALL ASLEEP WHILE SITTING AND READING: MODERATE CHANCE OF DOZING

## 2024-06-13 ASSESSMENT — ENCOUNTER SYMPTOMS
NEUROLOGICAL NEGATIVE: 1
RESPIRATORY NEGATIVE: 1
PSYCHIATRIC NEGATIVE: 1
CARDIOVASCULAR NEGATIVE: 1
CONSTITUTIONAL NEGATIVE: 1

## 2024-06-13 ASSESSMENT — PAIN SCALES - GENERAL: PAINLEVEL: 6

## 2024-06-13 ASSESSMENT — LIFESTYLE VARIABLES
SKIP TO QUESTIONS 9-10: 1
HOW MANY STANDARD DRINKS CONTAINING ALCOHOL DO YOU HAVE ON A TYPICAL DAY: PATIENT DOES NOT DRINK
AUDIT-C TOTAL SCORE: 0
HOW OFTEN DO YOU HAVE SIX OR MORE DRINKS ON ONE OCCASION: NEVER
HOW OFTEN DO YOU HAVE A DRINK CONTAINING ALCOHOL: NEVER

## 2024-06-13 NOTE — ASSESSMENT & PLAN NOTE
- HSAT showed Moderate ANGELA with AHI ~ 15  - will start APAP 6-12 cwp via [facility]  - lengthy discussion on ANGELA and PAP therapy education as well as the tips to be successful with PAP treatment  - patient voiced understanding  - patient will follow-up in 1-3 months and bring equipment to the follow-up clinic

## 2024-06-13 NOTE — PROGRESS NOTES
Patient: Tyra Hogan    [id number]  : 1965 -- AGE 58 y.o.    Provider: Man Callaway MD     Location [facility]   Service Date: 2024              [facility]  New Visit Note      HISTORY OF PRESENT ILLNESS     The patient's referring provider is: Katie Chapa MD; Katie Chapa MD    HISTORY OF PRESENT ILLNESS   Tyra Hogan is a 58 y.o. female with h/o ANGELA and Insomnia who presents to a [facility] for a sleep medicine evaluation with concerns of Pt here today for NPV..     Past Sleep History  Patient has the following sleep-related diagnoses: obstructive sleep apnea. Prior sleep study results: significant for ANGELA with AHI ~ 15 (3%)    Current History    On today's visit, the patient reports classic OSAS symptoms.  She is not sure if she snores but she does report a lot of nocturnal awakening.  She is not particularly sleepy during the day but she does feel fatigue frequently.  She endorses strong family history of sleep apnea (all her children but no one uses CPAP except for the oldest son who also found to have heart condition).  She also endorses frequent sleep paralysis    Sleep schedule:      Weekdays / Work Days Weekends / Days Off   Bedtime 11 pm  same as weekdays   Sleep latency 60 min same as weekdays   Wake time 8 am  same as weekdays   Total sleep time  Average/day:  Total sleep time 5-6 hours/day Same as weekdays   Naps No   Nocturnal awakenings Yes, about 4-5 times a night     Preferred sleeping position: SLEEP POSITION: sidelying    Sleep-related ROS:    Sleep Initiation: takes 60 min to fall asleep  Problems going to sleep associated with: environement    Sleep Maintenance: wakes up about 4-5 times per night, easily returns to sleep after awakening, and prolonged awakenings  Problems staying asleep associated with: Problems Staying Asleep: nocturia and no clear reason    Breathing during sleep: snorting  during sleep, witnessed apneas, and gasping/choking for air    RLS screen:  RLSSCREEN: - Sensations: Patient does not have unusual sensations in their extremities that cause an urge to move them     Daytime Symptoms  On awakening patient reports: wake unrefreshed, morning headaches, morning dry mouth, and morning sore throat    Patient reports DAYTIME SYMPTOMS: irritability during the day and difficulty with memory or concentration during the day  Patient denies daytime symptoms including: Denies: feeling sleepy when driving  Fatigue: symptoms bothersome, but easily able to carry out all usual work/school/family activities    ESS: 6  SABINA: 23  FOSQ: 31      REVIEW OF SYSTEMS     REVIEW OF SYSTEMS  Review of Systems   Constitutional: Negative.    HENT: Negative.     Respiratory: Negative.     Cardiovascular: Negative.    Genitourinary: Negative.    Skin: Negative.    Neurological: Negative.    Psychiatric/Behavioral: Negative.       SLEEP ROS: snorting, choking, periods of not breathing      ALLERGIES AND MEDICATIONS     ALLERGIES  Allergies   Allergen Reactions    Gabapentin Anxiety, Palpitations, Shortness of breath and Other       MEDICATIONS  Current Outpatient Medications   Medication Sig Dispense Refill    albuterol (Ventolin HFA) 90 mcg/actuation inhaler Inhale 2 puffs every 4 hours if needed for shortness of breath.      albuterol 2.5 mg /3 mL (0.083 %) nebulizer solution Inhale 3 mL (2.5 mg) every 4 hours if needed for wheezing.      ascorbic acid (Vitamin C) 250 mg tablet Take 1 tablet (250 mg) by mouth once daily.      cetirizine (ZyrTEC) 10 mg tablet Take 1 tablet (10 mg) by mouth as needed at bedtime.      cholecalciferol (Vitamin D-3) 50 MCG (2000 UT) tablet Take 1 tablet (2,000 Units) by mouth once daily.      cyclobenzaprine (Flexeril) 10 mg tablet Take 1 tablet (10 mg) by mouth as needed at bedtime.      EVENING PRIMROSE OIL ORAL Take by mouth once daily.      ibuprofen 600 mg tablet Take 1 tablet  (600 mg) by mouth every 8 hours if needed for headaches.      omega 3-dha-epa-fish oil (Fish OiL) 1,000 mg (120 mg-180 mg) capsule Take 1 capsule (1,000 mg) by mouth once daily.      ondansetron (Zofran) 4 mg tablet Take 1 tablet (4 mg) by mouth every 8 hours if needed.      FAIZAN'S WORT ORAL Take by mouth once daily.       No current facility-administered medications for this visit.         PAST HISTORY     PAST MEDICAL HISTORY  She  has no past medical history on file.    PAST SURGICAL HISTORY:  Past Surgical History:   Procedure Laterality Date    APPENDECTOMY  2003     SECTION, LOW TRANSVERSE  3/2/1995    COLOSTOMY  2023    OTHER SURGICAL HISTORY  2021    Appendectomy    OTHER SURGICAL HISTORY  2021     section    OTHER SURGICAL HISTORY  2021    Tonsillectomy       FAMILY HISTORY  Family History   Problem Relation Name Age of Onset    Hypertension Mother Mother     Diabetes Mother Mother     Other (cardiac disorder) Mother Mother     Other (Other) Father      Other (cerebral vascular accident) Sister         She does have a family history of sleep disorder.    SOCIAL HISTORY  She  reports that she quit smoking about 29 years ago. Her smoking use included cigarettes. She started smoking about 44 years ago. She has a 3.8 pack-year smoking history. She has never used smokeless tobacco. She reports that she does not currently use alcohol. She reports that she does not currently use drugs after having used the following drugs: Marijuana. She currently lives alone.     Caffeine consumption: No  Alcohol consumption: No  Smoking: No  Marijuana: No      PHYSICAL EXAM     VITAL SIGNS: /74   Pulse 83   Temp 36.3 °C (97.3 °F)   Wt 61.2 kg (135 lb)   SpO2 98%   BMI 22.47 kg/m²      CURRENT WEIGHT:   Vitals:    24 1359   Weight: 61.2 kg (135 lb)     Body mass index is 22.47 kg/m².  PREVIOUS WEIGHTS:  Wt Readings from Last 3 Encounters:   24 61.2 kg (135 lb)    05/13/24 62.9 kg (138 lb 9.6 oz)   11/27/23 59 kg (130 lb)       Physical Exam  Vitals reviewed.   Constitutional:       General: She is not in acute distress.     Appearance: Normal appearance. She is well-developed and normal weight.   HENT:      Head: Normocephalic and atraumatic.      Nose: Nose normal. No congestion or rhinorrhea.      Mouth/Throat:      Mouth: Mucous membranes are moist.      Pharynx: Oropharynx is clear. No oropharyngeal exudate.   Eyes:      General: No scleral icterus.     Extraocular Movements: Extraocular movements intact.      Conjunctiva/sclera: Conjunctivae normal.      Pupils: Pupils are equal, round, and reactive to light.   Neck:      Thyroid: No thyroid mass or thyroid tenderness.      Vascular: No JVD.   Cardiovascular:      Rate and Rhythm: Normal rate.      Pulses: Normal pulses.   Pulmonary:      Effort: Pulmonary effort is normal. No respiratory distress.   Musculoskeletal:      Cervical back: Normal range of motion and neck supple. No rigidity or tenderness.   Lymphadenopathy:      Cervical: No cervical adenopathy.   Neurological:      Mental Status: She is alert and oriented to person, place, and time.   Psychiatric:         Mood and Affect: Mood normal.         Behavior: Behavior normal.         Thought Content: Thought content normal.       PHYSICAL EXAM: MODIFIED MALLAMPATI SCORE: III (soft and hard palate and base of uvula visible)  TONGUE SCALLOPING: with scalloping      RESULTS/DATA     Bicarbonate (mmol/L)   Date Value   05/13/2024 32     Iron (ug/dL)   Date Value   05/13/2024 93     % Saturation (%)   Date Value   05/13/2024 28     TIBC (ug/dL)   Date Value   05/13/2024 331     Ferritin (ng/mL)   Date Value   05/13/2024 78             ASSESSMENT/PLAN     Ms. Hogan is a 58 y.o. female and She was referred to the [facility] for evaluation of ANGELA    Problem List, Orders, Assessment, Recommendations:  Problem List Items Addressed This  Visit             ICD-10-CM    ANGELA (obstructive sleep apnea) - Primary G47.33     - HSAT showed Moderate ANGELA with AHI ~ 15  - will start APAP 6-12 cwp via [facility]  - lengthy discussion on ANGELA and PAP therapy education as well as the tips to be successful with PAP treatment  - patient voiced understanding  - patient will follow-up in 1-3 months and bring equipment to the follow-up clinic           Relevant Orders    Positive Airway Pressure (PAP) Therapy    Sleep paralysis G47.8     Possibly secondary to fragmentation of sleep 2/2 untreated ANGELA  Instructed patient to monitor and document the frequency of occurrence and we will continue to monitor symptom improvement as ANGELA gets treated with CPAP         Insomnia G47.00     Mainly maintenance issue.  Will keep monitoring.    Likely 2/2 untreated ANGELA          Other Visit Diagnoses         Codes    Sleep apnea, unspecified type     G47.30            Disposition    Return to clinic in 3-4 months

## 2024-06-13 NOTE — PATIENT INSTRUCTIONS
[facility]  [facility]  [address]  [facility] [address]  [phone]       NAME: Tyra Hogan   DATE: 06/13/24    Your Sleep Provider Today: Man Callaway MD  Your Primary Care Physician: Katie Chapa MD   Your Referring Provider: Katie Chapa MD    DIAGNOSIS:   1. Sleep apnea, unspecified type  Referral to Adult Sleep Medicine          Thank you for coming to the Sleep Medicine Clinic today! Your sleep medicine provider today was: Man Callaway MD Below is a summary of your treatment plan, other important information, and our contact numbers:      TREATMENT PLAN     - Follow-up in 3-4 months.  - If not already done, sign up for 'My Chart' and send prescription requests or messages through this      Obstructive sleep apnea (ANGELA): ANGELA is a sleep disorder where your upper airway muscles relax during sleep and the airway intermittently and repetitively narrows and collapses leading to blocked airway (apnea) which, in turn, can disrupt breathing in sleep, lower oxygen levels while you sleep and cause night time wakings. Because apnea may cause higher carbon dioxide or low oxygen levels, untreated ANGELA can lead to heart arrhythmia, elevation of blood pressure, and make it harder for the body to consolidate memory and metabolize (leading to higher blood sugars at night).   Frequent partial arousals occur during sleep resulting in sleep deprivation and daytime sleepiness. ANGELA is associated with an increased risk of cardiovascular disease, stroke, hypertension, and insulin resistance. Moreover, untreated ANGELA with excessive daytime sleepiness can increase the risk of motor vehicular accidents.    Some conservative strategies for ANGELA are:   Positional therapy - Avoid sleeping on your back.   Healthy diet, exercise, and optimizing weight encouraged.   Avoid alcohol late in the evening as it can make sleep apnea worse.     Safety: Avoid  "driving and operating heavy equipment while sleepy. Drowsy driving may lead to life-threatening motor vehicle accidents.     Common treatment options for sleep apnea include weight management, positional therapy, Positive Airway Therapy (PAP) therapy, oral appliance therapy, hypoglossal nerve stimulation, and select airway surgeries.     Starting Positive Airway Pressure: You were ordered a device to wear when you sleep called PAP (Positive Airway Pressure) to treat your sleep apnea. The order will be submitted to a durable medical equipment company who will arrange setting you up with the device. They will provide all the necessary equipment and discuss use and maintenance of the device with you.     Please followup with us in 1-2 months of starting PAP to see how well it is working for you or to troubleshoot. Please bring your equipment to this initial followup visit.    **Please bring all PAP equipment with you to follow up appointments unless told otherwise.**     Important things to keep in mind as you start PAP:  Insurance will monitor your usage during the first 90 days.  You should use your PAP - \"all night, every night\", for your health. The bare minimum is to use your PAP device while sleeping = at least 4 hours per day at least 5 days per week. Otherwise, your PAP device may be reclaimed by your PAP vendor at 90 days.  There are many mask to choose from to wear with your PAP machine. If you are not comfortable with the first mask issued to you, call your DME and ask for another option to try. Some have a 30 day return policy.  Discuss with your provider if you are having issues breathing with the machine or the temperature or humidity feel uncomfortable  Expect to have an adjustment period when you start your device. It helps to continuing wearing the machine every day for a period of time until you get more used to it. You can practice with wearing the mask alone if you need, then add in the PAP air " pressure a few days later.   Reach out for help if you are struggling! The sleep medicine department can be reached at [phone]  We encourage you to download data monitoring apps to your phone. For ResMed AirSense 10/11 - MyAir umm. For Application Security DreamStation - DreamMapper. Both are available in the [employer] store for free and are a great tool to monitor your progress with your CPAP night to night.           Instructions - Common ANGELA Recs: - For your sleep apnea, continue to use your PAP every night and use it whenever you are sleeping.   - Avoid alcohol or sedatives several hours prior to sleeping.   - Get additional supplies for your PAP (e.g., mask, hose, filters) every 3 months or as your insurance allows from your [employer] [employer] company. Replacement cushions for your PAP mask can be requested monthly if airseals are an issue.  - Remember to clean your mask, tubings, and water chamber regularly as instructed.  - Avoid driving or operating heavy machinery when drowsy. A person driving while sleepy is five (5) times more likely to have an accident. If you feel sleepy, pull over and take a short power nap (sleep for less than 30 minutes). Otherwise, ask somebody to drive you.    Follow-up Appointment:   3-4 months      IMPORTANT INFORMATION     Call 911 for medical emergencies.  Our offices are generally open from Monday-Friday, 9 am - 5 pm.  If you need to get in touch with me, you may either call me and my team(number is below) or you can use [employer].  If a referral for a test, for CPAP, or for another specialist was made, and you have not heard about scheduling this within a week, please call scheduling at [phone] ([phone]).  If you are unable to make your appointment for clinic or an overnight study, kindly call the office at least 48 hours in advance to cancel and reschedule.  If you are on CPAP, please bring your device's card or the device to each clinic appointment.   There are no supporting services by either the  sleep doctors or their staff on weekends and Holidays, or after 5 PM on weekdays.   If you have been asked to come to a sleep study, make sure you bring toiletries, a comfy pillow, and any nighttime medications that you may regularly take. Also be sure to eat dinner before you arrive. We generally do not provide meals.      PRESCRIPTIONS     We require 7 days advanced notice for prescription refills. If we do not receive the request in this time, we cannot guarantee that your medication will be refilled in time.      IMPORTANT PHONE NUMBERS     Sleep Medicine Clinic Fax: [phone]  Appointments (for Adult Sleep Clinic): [phone] - option 2  Appointments (For Sleep Studies): [phone] - option 3  Behavioral Sleep Medicine: [phone]  Sleep Surgery: [phone]  ENT (Otolaryngology): [phone]  Headache Clinic (Neurology): [phone]  Neurology: [phone]  Psychiatry: [phone]  Pulmonary Function Testing (PFT) Center: [phone]  Pulmonary Medicine: [phone]  [place] (DME): [phone]  [employer] (DME): [phone]  [facility] [place] [phone]      OUR ADULT SLEEP MEDICINE TEAM   Please do not hesitate to call the office or sleep nurse with any questions between appointments:    Adult Sleep Nurses (Tracy Rosas, RN and Carol Kaiser RN):  For clinical questions and refilling prescriptions: [phone]  Email sleep diaries and other documents at: [email or web address]    Adult Sleep Medicine Secretaries:  Kylah Matthews (For [place]rise/[place]/Yeh/[place]):   P: [phone]  F: [phone]  Pippa Hassan (For [place]): P: [phone]  Fax: [phone]  Ciera Moore (For [place]cevic/Blank): P: [phone]  F: [phone]  Jany Salazar (For [place]): P: [phone]  F: [phone]  Herlinda Alvarez (For Marilyn/Gomez/Josef): P: [phone]  F: [phone]  Ethel Dyer (For Gamaliel/Hector):  "P: [phone]  F: [phone]     Adult Sleep Medicine Advanced Practice Providers:  Jonatan Forde ([place], [place])  Jess Dyer ([facility])  Lashay Hanks CNP ([place], [place], [place])  Carin Zee CNP ([place], [place], [place])  Phoebe Guidry ([place], [place], [place])  Hernandez Young CNP ([place], [place])        OUR SLEEP TESTING LOCATIONS     Our team will contact you to schedule your sleep study, however, you can contact us as follow:  Main Phone Line (scheduling only): [phone]), option 3  Adult and Pediatric Locations   [place] (6 years and older): [facility] - 4th floor ([address]) After hours line: [phone]  [facility] (Main campus: All ages): [facility], 6th floor. After hours line: [phone]   [place] (5 years and older; younger considered on case-by-case basis): [address]; [address], [address]. Scheduling  After hours line: [phone]   [place] (6 years and older): [address]; [address]   [place] (6 years and older): [address], [address]  After hours line: [phone]   [place] (13 years and older) in [place]: [address], 2nd floor  After hours line: [phone]   [place] (13 year and older): [address]  After hours line: [phone]     Adult Only Locations:   [place] (18 years and older): [address] [facility], 2nd floor   [place] (18 years and older): [address]; 4th floor  After hours line: [phone]  [facility] (18 years and older) at [place]: [address] [facility]  After hours line: [phone]          CONTACTING YOUR SLEEP MEDICINE PROVIDER     Send a message directly to your provider through \"My Chart\", which is the email service through your  Records Account: [email or web address]   Call [phone] and leave a " "message. One of the administrative assistants will forward the message to your sleep medicine provider through \"My Chart\" and/or email.     Your sleep medicine provider for this visit was: Man Callaway MD    "

## 2024-06-13 NOTE — ASSESSMENT & PLAN NOTE
Possibly secondary to fragmentation of sleep 2/2 untreated ANGELA  Instructed patient to monitor and document the frequency of occurrence and we will continue to monitor symptom improvement as ANGELA gets treated with CPAP

## 2024-06-20 ENCOUNTER — TELEPHONE (OUTPATIENT)
Dept: PRIMARY CARE | Facility: CLINIC | Age: 59
End: 2024-06-20
Payer: MEDICAID

## 2024-06-20 NOTE — TELEPHONE ENCOUNTER
"Pt left message \"I was seen in the ER last night and they did a CT scan.  They said that I had a sinus infection and prescribed an antibiotic.  I was wondering if Dr. Ann could change the antibiotic to something that won't be as hard on my stomach?\"  (Pt sent images of antibiotic via My chart message)  "

## 2024-06-24 DIAGNOSIS — J01.90 ACUTE SINUSITIS, RECURRENCE NOT SPECIFIED, UNSPECIFIED LOCATION: Primary | ICD-10-CM

## 2024-06-24 RX ORDER — AZITHROMYCIN 250 MG/1
TABLET, FILM COATED ORAL DAILY
Qty: 6 TABLET | Refills: 0 | Status: SHIPPED | OUTPATIENT
Start: 2024-06-24 | End: 2024-06-29

## 2024-07-05 DIAGNOSIS — R79.82 ELEVATED HIGH SENSITIVITY C-REACTIVE PROTEIN: Primary | ICD-10-CM

## 2024-08-22 ENCOUNTER — OFFICE VISIT (OUTPATIENT)
Dept: SLEEP MEDICINE | Facility: HOSPITAL | Age: 59
End: 2024-08-22
Payer: MEDICAID

## 2024-08-22 VITALS
DIASTOLIC BLOOD PRESSURE: 66 MMHG | OXYGEN SATURATION: 98 % | TEMPERATURE: 98.2 F | WEIGHT: 141 LBS | BODY MASS INDEX: 23.46 KG/M2 | SYSTOLIC BLOOD PRESSURE: 109 MMHG | HEART RATE: 74 BPM

## 2024-08-22 DIAGNOSIS — G47.00 INSOMNIA, UNSPECIFIED TYPE: ICD-10-CM

## 2024-08-22 DIAGNOSIS — G47.33 OSA (OBSTRUCTIVE SLEEP APNEA): Primary | ICD-10-CM

## 2024-08-22 PROCEDURE — 1036F TOBACCO NON-USER: CPT | Performed by: STUDENT IN AN ORGANIZED HEALTH CARE EDUCATION/TRAINING PROGRAM

## 2024-08-22 PROCEDURE — 99214 OFFICE O/P EST MOD 30 MIN: CPT | Performed by: STUDENT IN AN ORGANIZED HEALTH CARE EDUCATION/TRAINING PROGRAM

## 2024-08-22 ASSESSMENT — SLEEP AND FATIGUE QUESTIONNAIRES
HOW LIKELY ARE YOU TO NOD OFF OR FALL ASLEEP WHILE WATCHING TV: WOULD NEVER DOZE
SLEEP_PROBLEM_INTERFERES_DAILY_ACTIVITIES: SOMEWHAT
SITING INACTIVE IN A PUBLIC PLACE LIKE A CLASS ROOM OR A MOVIE THEATER: SLIGHT CHANCE OF DOZING
WORRIED_DISTRESSED_DUE_TO_SLEEP: MUCH
HOW LIKELY ARE YOU TO NOD OFF OR FALL ASLEEP WHEN YOU ARE A PASSENGER IN A CAR FOR AN HOUR WITHOUT A BREAK: WOULD NEVER DOZE
DIFFICULTY_FALLING_ASLEEP: MODERATE
SATISFACTION_WITH_CURRENT_SLEEP_PATTERN: VERY SATISFIED
HOW LIKELY ARE YOU TO NOD OFF OR FALL ASLEEP WHILE SITTING AND TALKING TO SOMEONE: WOULD NEVER DOZE
SLEEP_PROBLEM_NOTICEABLE_TO_OTHERS: MUCH
HOW LIKELY ARE YOU TO NOD OFF OR FALL ASLEEP WHILE LYING DOWN TO REST IN THE AFTERNOON WHEN CIRCUMSTANCES PERMIT: SLIGHT CHANCE OF DOZING
ESS-CHAD TOTAL SCORE: 4
HOW LIKELY ARE YOU TO NOD OFF OR FALL ASLEEP IN A CAR, WHILE STOPPED FOR A FEW MINUTES IN TRAFFIC: SLIGHT CHANCE OF DOZING
DIFFICULTY_STAYING_ASLEEP: MODERATE
HOW LIKELY ARE YOU TO NOD OFF OR FALL ASLEEP WHILE SITTING QUIETLY AFTER LUNCH WITHOUT ALCOHOL: WOULD NEVER DOZE
HOW LIKELY ARE YOU TO NOD OFF OR FALL ASLEEP WHILE SITTING AND READING: SLIGHT CHANCE OF DOZING
WAKING_TOO_EARLY: MILD

## 2024-08-22 ASSESSMENT — ENCOUNTER SYMPTOMS
CARDIOVASCULAR NEGATIVE: 1
CONSTITUTIONAL NEGATIVE: 1
PSYCHIATRIC NEGATIVE: 1
RESPIRATORY NEGATIVE: 1
NEUROLOGICAL NEGATIVE: 1

## 2024-08-22 ASSESSMENT — PAIN SCALES - GENERAL: PAINLEVEL: 0-NO PAIN

## 2024-08-23 NOTE — ASSESSMENT & PLAN NOTE
Actually doing very well with her PAP therapy.  Significant symptomatic improvement.  She was even able to drive to [place] by herself without feeling like she needs to pull over and take a nap.  Mouth breathing issue should improve after more practice with CPAP potentially with help of mouth tape or chin strap.  I am not particularly worried about her leak.  I don't think FFM is the solution  Keep current setting, and practice nasal breathing with CPAP  Renew supply order  I fitted her with a n30i (S) and she felt pretty comfortable with it

## 2024-08-23 NOTE — PROGRESS NOTES
Patient: Tyra Hogan    [id number]  : 1965 -- AGE 58 y.o.    Provider: Man Callaway MD     Location [facility]   Service Date: 2024              [facility]  Followup Visit Note    HISTORY OF PRESENT ILLNESS     HISTORY OF PRESENT ILLNESS   Tyra Hogan is a 58 y.o. female with h/o ANGELA who presents to a [facility] for followup.     Assessment and plan from last visit: 2024    Ms. Hogan is a 58 y.o. female and She was referred to the [facility] for evaluation of ANGELA     Problem List, Orders, Assessment, Recommendations:  Problem List Items Addressed This Visit               ICD-10-CM     ANGELA (obstructive sleep apnea) - Primary G47.33       - HSAT showed Moderate ANGELA with AHI ~ 15  - will start APAP 6-12 cwp via [facility]  - lengthy discussion on ANGELA and PAP therapy education as well as the tips to be successful with PAP treatment  - patient voiced understanding  - patient will follow-up in 1-3 months and bring equipment to the follow-up clinic              Relevant Orders     Positive Airway Pressure (PAP) Therapy     Sleep paralysis G47.8       Possibly secondary to fragmentation of sleep 2/2 untreated ANGELA  Instructed patient to monitor and document the frequency of occurrence and we will continue to monitor symptom improvement as ANGELA gets treated with CPAP           Insomnia G47.00       Mainly maintenance issue.  Will keep monitoring.    Likely 2/2 untreated ANGELA            Other Visit Diagnoses           Codes     Sleep apnea, unspecified type     G47.30                Disposition     Return to clinic in 3-4 months       Current History    On today's visit, the patient reports that she has been using her CPAP consistently.  She even travels with it.  She feels that it helps her but there are some issues with the therapy that she would like to address.  She was initially given a p10 mask and  have been able to use it, though there is some dry mouth issue.  Then she got a call from [facility] and was told that she was having too much leak.  She was recommended to switch over to a FFM.  She then found FFM even more difficult especially when she wakes up in the morning and finding herself feeling like she couldn't close her mouth.  She found that feeling scary.  Otherwise, she feel definitely more awake during the day, less need to want to doze off or take a nap.  She was able to drive herself to [place] and back which she has not been able to do.  Her FOSQ improved from 31 to 37    PAP Info  DURABLE MEDICAL EQUIPMENT COMPANY: [employer]  Machine: THERAPY: RESMED AIRSENSE 11  6 cm H2O - 12 cm H2O   Issues with therapy: ISSUES WITH THERAPY: as described above  Benefits with PAP: PERCEIVED BENEFITS OF PAP: refreshing sleep reduced daytime sleepiness    RLS Followup:   none.    Daytime Symptoms    Patient reports DAYTIME SYMPTOMS: no daytime symptoms  Patient denies daytime symptoms including: Denies: excessive daytime sleepiness feeling sleepy when driving    Naps: No  Fatigue: denies feeling fatigue    ESS: 4  SABINA: 13  FOSQ: 37    REVIEW OF SYSTEMS     REVIEW OF SYSTEMS  Review of Systems   Constitutional: Negative.    HENT: Negative.     Respiratory: Negative.     Cardiovascular: Negative.    Genitourinary: Negative.    Skin: Negative.    Neurological: Negative.    Psychiatric/Behavioral: Negative.           ALLERGIES AND MEDICATIONS     ALLERGIES  Allergies   Allergen Reactions    Gabapentin Anxiety, Palpitations, Shortness of breath and Other    Lidocaine Unknown     Other Reaction(s):  Unknown       MEDICATIONS: She has a current medication list which includes the following prescription(s): albuterol - Inhale 2 puffs every 4 hours if needed for shortness of breath, albuterol - Inhale 3 mL (2.5 mg) every 4 hours if needed for wheezing, ascorbic acid - Take 1 tablet (250 mg) by mouth once daily,  cetirizine - Take 1 tablet (10 mg) by mouth as needed at bedtime, cholecalciferol - Take 1 tablet (2,000 Units) by mouth once daily, cyclobenzaprine - Take 1 tablet (10 mg) by mouth as needed at bedtime, evening primrose oil - Take by mouth once daily, ibuprofen - Take 1 tablet (600 mg) by mouth every 8 hours if needed for headaches, omega 3-dha-epa-fish oil - Take 1 capsule (1,000 mg) by mouth once daily, ondansetron - Take 1 tablet (4 mg) by mouth every 8 hours if needed, and jordin's wort - Take by mouth once daily.    PAST MEDICAL HISTORY : She  has no past medical history on file.    PAST SURGICAL HISTORY: She  has a past surgical history that includes Other surgical history (2021); Other surgical history (2021); Other surgical history (2021); Appendectomy (2003);  section, low transverse (3/2/1995); and Colostomy (2023).     FAMILY HISTORY: No changes since previous visit. Otherwise non-contributory as charted.     SOCIAL HISTORY  She  reports that she quit smoking about 30 years ago. Her smoking use included cigarettes. She started smoking about 45 years ago. She has a 3.8 pack-year smoking history. She has never used smokeless tobacco. She reports that she does not currently use alcohol. She reports that she does not currently use drugs after having used the following drugs: Marijuana.       PHYSICAL EXAM     VITAL SIGNS: /66   Pulse 74   Temp 36.8 °C (98.2 °F)   Wt 64 kg (141 lb)   SpO2 98%   BMI 23.46 kg/m²      PREVIOUS WEIGHTS:  Wt Readings from Last 3 Encounters:   24 64 kg (141 lb)   24 61.2 kg (135 lb)   24 62.9 kg (138 lb 9.6 oz)         RESULTS/DATA     Bicarbonate (mmol/L)   Date Value   2024 32     Iron (ug/dL)   Date Value   2024 93     % Saturation (%)   Date Value   2024 28     TIBC (ug/dL)   Date Value   2024 331     Ferritin (ng/mL)   Date Value   2024 78       PAP Adherence  A PAP adherence  download was obtained and data was reviewed personally today in clinic.        ASSESSMENT/PLAN     Ms. Hogan is a 58 y.o. female and she returns in followup to the [facility] for ANGELA.    Problem List, Orders, Assessment, Recommendations:  Problem List Items Addressed This Visit             ICD-10-CM    ANGELA (obstructive sleep apnea) - Primary G47.33     Actually doing very well with her PAP therapy.  Significant symptomatic improvement.  She was even able to drive to [place] by herself without feeling like she needs to pull over and take a nap.  Mouth breathing issue should improve after more practice with CPAP potentially with help of mouth tape or chin strap.  I am not particularly worried about her leak.  I don't think FFM is the solution  Keep current setting, and practice nasal breathing with CPAP  Renew supply order  I fitted her with a n30i (S) and she felt pretty comfortable with it         Relevant Orders    Positive Airway Pressure (PAP) Therapy    Insomnia G47.00     Down to about once a night at most            Disposition    Return to clinic in 2 months

## 2024-10-24 ENCOUNTER — APPOINTMENT (OUTPATIENT)
Dept: SLEEP MEDICINE | Facility: HOSPITAL | Age: 59
End: 2024-10-24
Payer: MEDICAID

## 2024-11-07 ENCOUNTER — OFFICE VISIT (OUTPATIENT)
Dept: SLEEP MEDICINE | Facility: HOSPITAL | Age: 59
End: 2024-11-07
Payer: MEDICAID

## 2024-11-07 VITALS
OXYGEN SATURATION: 100 % | SYSTOLIC BLOOD PRESSURE: 127 MMHG | HEART RATE: 63 BPM | TEMPERATURE: 97.7 F | DIASTOLIC BLOOD PRESSURE: 83 MMHG | WEIGHT: 142 LBS | BODY MASS INDEX: 23.63 KG/M2

## 2024-11-07 DIAGNOSIS — G47.33 OSA (OBSTRUCTIVE SLEEP APNEA): Primary | ICD-10-CM

## 2024-11-07 DIAGNOSIS — G47.00 INSOMNIA, UNSPECIFIED TYPE: ICD-10-CM

## 2024-11-07 PROCEDURE — 1036F TOBACCO NON-USER: CPT | Performed by: STUDENT IN AN ORGANIZED HEALTH CARE EDUCATION/TRAINING PROGRAM

## 2024-11-07 PROCEDURE — 99214 OFFICE O/P EST MOD 30 MIN: CPT | Performed by: STUDENT IN AN ORGANIZED HEALTH CARE EDUCATION/TRAINING PROGRAM

## 2024-11-07 PROCEDURE — 99214 OFFICE O/P EST MOD 30 MIN: CPT | Mod: GC | Performed by: STUDENT IN AN ORGANIZED HEALTH CARE EDUCATION/TRAINING PROGRAM

## 2024-11-07 ASSESSMENT — LIFESTYLE VARIABLES
HOW MANY STANDARD DRINKS CONTAINING ALCOHOL DO YOU HAVE ON A TYPICAL DAY: PATIENT DOES NOT DRINK
HOW OFTEN DO YOU HAVE A DRINK CONTAINING ALCOHOL: NEVER
AUDIT-C TOTAL SCORE: 0
HOW OFTEN DO YOU HAVE SIX OR MORE DRINKS ON ONE OCCASION: NEVER
SKIP TO QUESTIONS 9-10: 1

## 2024-11-07 ASSESSMENT — ENCOUNTER SYMPTOMS
CARDIOVASCULAR NEGATIVE: 1
NEUROLOGICAL NEGATIVE: 1
RESPIRATORY NEGATIVE: 1
SLEEP DISTURBANCE: 1
NERVOUS/ANXIOUS: 1
CONSTITUTIONAL NEGATIVE: 1

## 2024-11-07 ASSESSMENT — PAIN SCALES - GENERAL: PAINLEVEL_OUTOF10: 0-NO PAIN

## 2024-11-07 NOTE — PROGRESS NOTES
Patient: Tyra Hogan    [id number]  : 1965 -- AGE 59 y.o.    Provider: Valeria Astorga MD     Location [facility]   Service Date: 2024              [facility]  Followup Visit Note    HISTORY OF PRESENT ILLNESS     HISTORY OF PRESENT ILLNESS   Tyra Hogan is a 59 y.o. female with h/o ANGELA who presents to a [facility] for followup.     Assessment and plan from last visit: 2024    Ms. Hogan is a 58 y.o. female and she returns in followup to the [facility] for ANGELA.     Problem List, Orders, Assessment, Recommendations:  Problem List Items Addressed This Visit               ICD-10-CM     ANGELA (obstructive sleep apnea) - Primary G47.33       Actually doing very well with her PAP therapy.  Significant symptomatic improvement.  She was even able to drive to [place] by herself without feeling like she needs to pull over and take a nap.  Mouth breathing issue should improve after more practice with CPAP potentially with help of mouth tape or chin strap.  I am not particularly worried about her leak.  I don't think FFM is the solution  Keep current setting, and practice nasal breathing with CPAP  Renew supply order  I fitted her with a n30i (S) and she felt pretty comfortable with it           Relevant Orders     Positive Airway Pressure (PAP) Therapy     Insomnia G47.00       Down to about once a night at most               Disposition   Return to clinic in 2 months    Current History    On today's visit, the patient reports that she has been using her CPAP consistently.  She even travels with it.  She feels that it helps her but there are some issues with the therapy that she would like to address.  She was initially given a p10 mask and have been able to use it, though there is some dry mouth issue.  Then she was switched over to a FFM by [facility] which she hated.  She now has an Airfit N30i, which is  better, but she still struggles with leaks and readjusting during the night.  She would like to try another type of mask.  Otherwise, she feel definitely more awake during the day, less need to want to doze off or take a nap.  She is also concerned about sleep onset and sleep maintenance insomnia.  She takes melatonin 3 mg and magnesium at 8:30 and sometimes falls asleep at 10 PM.  Other times, she may not fall asleep until 1AM.  Sleep hygiene is not an issue as she completed a integrative medicine course on this and follows recommendations nightly.  She is a worrier and her dog wakes her up during the night.    PAP Info  DURABLE MEDICAL EQUIPMENT COMPANY: [employer]  Machine: THERAPY: RESMED AIRSENSE 11  6 cm H2O - 12 cm H2O with Airfit N30i  Issues with therapy: ISSUES WITH THERAPY: as described above  Benefits with PAP: PERCEIVED BENEFITS OF PAP: refreshing sleep reduced daytime sleepiness  Usage > 4 hr: 83%, avg time: 6.5 hrs  Pressure: median 7.4, 95th 9.6  Leaks: median 12, 95th 33.2  rAHI: 0.6     RLS Followup:   none.    Daytime Symptoms    Patient reports DAYTIME SYMPTOMS: no daytime symptoms  Patient denies daytime symptoms including: Denies: excessive daytime sleepiness feeling sleepy when driving    Naps: No  Fatigue: denies feeling fatigue    ESS:  4  SABINA:  20  FOSQ: 33    REVIEW OF SYSTEMS     REVIEW OF SYSTEMS  Review of Systems   Constitutional: Negative.    HENT: Negative.     Respiratory: Negative.     Cardiovascular: Negative.    Genitourinary: Negative.    Skin: Negative.    Neurological: Negative.    Psychiatric/Behavioral:  Positive for sleep disturbance (insomnia both SO and SM). The patient is nervous/anxious.    All other systems reviewed and are negative.      ALLERGIES AND MEDICATIONS     ALLERGIES  Allergies   Allergen Reactions    Gabapentin Anxiety, Palpitations, Shortness of breath and Other    Lidocaine Unknown     Other Reaction(s):  Unknown       MEDICATIONS: She has a  current medication list which includes the following prescription(s): albuterol - Inhale 2 puffs every 4 hours if needed for shortness of breath, albuterol - Inhale 3 mL (2.5 mg) every 4 hours if needed for wheezing, cetirizine - Take 1 tablet (10 mg) by mouth as needed at bedtime, cholecalciferol - Take 1 tablet (2,000 Units) by mouth once daily, ascorbic acid - Take 1 tablet (250 mg) by mouth once daily, cyclobenzaprine - Take 1 tablet (10 mg) by mouth as needed at bedtime (Patient not taking: Reported on 2024), evening primrose oil - Take by mouth once daily, ibuprofen - Take 1 tablet (600 mg) by mouth every 8 hours if needed for headaches (Patient not taking: Reported on 2024), omega 3-dha-epa-fish oil - Take 1 capsule (1,000 mg) by mouth once daily, ondansetron - Take 1 tablet (4 mg) by mouth every 8 hours if needed (Patient not taking: Reported on 2024), and jordin's wort - Take by mouth once daily.    PAST MEDICAL HISTORY : She  has no past medical history on file.    PAST SURGICAL HISTORY: She  has a past surgical history that includes Other surgical history (2021); Other surgical history (2021); Other surgical history (2021); Appendectomy (2003);  section, low transverse (3/2/1995); and Colostomy (2023).     FAMILY HISTORY: No changes since previous visit. Otherwise non-contributory as charted.     SOCIAL HISTORY  She  reports that she quit smoking about 30 years ago. Her smoking use included cigarettes. She started smoking about 45 years ago. She has a 3.8 pack-year smoking history. She has never used smokeless tobacco. She reports that she does not currently use alcohol. She reports that she does not currently use drugs after having used the following drugs: Marijuana.       PHYSICAL EXAM     VITAL SIGNS: /83   Pulse 63   Temp 36.5 °C (97.7 °F)   Wt 64.4 kg (142 lb)   SpO2 100% Comment: RA  BMI 23.63 kg/m²      PREVIOUS WEIGHTS:  Wt Readings  from Last 3 Encounters:   11/07/24 64.4 kg (142 lb)   08/22/24 64 kg (141 lb)   06/13/24 61.2 kg (135 lb)     RESULTS/DATA     Bicarbonate (mmol/L)   Date Value   05/13/2024 32     Iron (ug/dL)   Date Value   05/13/2024 93     % Saturation (%)   Date Value   05/13/2024 28     TIBC (ug/dL)   Date Value   05/13/2024 331     Ferritin (ng/mL)   Date Value   05/13/2024 78       PAP Adherence  A PAP adherence download was obtained and data was reviewed personally today in clinic. See above.    ASSESSMENT/PLAN     Ms. Hogan is a 59 y.o. female and she returns in follow-up to the [facility] for ANGELA and insomnia.  Doing well with CPAP other than excessive leak.  Mask troubleshooting today and she will try F&P Eson 2 Medium NM, new script today. She will call [facility] about her broken water tank.  Humidity and climate control optimized for dry mouth.  She can continue to use mouth tape if needed. For sleep onset insomnia, can stop melatonin if not seeing benefits.  Discussed working on listening to physiological cues that she is sleepy and to only spend time in bed sleeping. For sleep maintenance insomnia, given that her triggers are unchangeable environmental issues (dog), will focus on only returning to bed when sleepy and doing a quiet activity until tired.    Problem List, Orders, Assessment, Recommendations:  Problem List Items Addressed This Visit             ICD-10-CM    ANGELA (obstructive sleep apnea) - Primary G47.33    Insomnia G47.00     #ANGELA  -continue APAP 6-12 via [facility]  -fitted for F&P Eson 2 medium today, script sent  -optimized humidity and climate control for dry mouth, can use mouth tape if she wants  -pt to call [facility] for new water chamber    #Insomnia  -Encouraged patient to not be in bed until sleepy both at beginning of night and during awakenings  -Continue current sleep hygiene  -Ok to stop melatonin if not helping.    Disposition  Return to clinic in 6 months    Patient was  staffed with Dr. Cesia Astorga MD    Sleep Medicine Fellow  Division of Pulmonology and Sleep Medicine  [facility]

## 2025-02-20 ENCOUNTER — TELEPHONE (OUTPATIENT)
Dept: PRIMARY CARE | Facility: CLINIC | Age: 60
End: 2025-02-20
Payer: MEDICAID

## 2025-02-20 NOTE — TELEPHONE ENCOUNTER
"Pt left message \"I was calling to see if I can get a letter for jury duty?\"  Last OV 5/13/24  Pend OV none  "

## 2025-05-08 ENCOUNTER — OFFICE VISIT (OUTPATIENT)
Dept: SLEEP MEDICINE | Facility: HOSPITAL | Age: 60
End: 2025-05-08
Payer: MEDICAID

## 2025-05-08 VITALS
OXYGEN SATURATION: 100 % | SYSTOLIC BLOOD PRESSURE: 112 MMHG | DIASTOLIC BLOOD PRESSURE: 71 MMHG | RESPIRATION RATE: 18 BRPM | WEIGHT: 141 LBS | BODY MASS INDEX: 23.46 KG/M2 | HEART RATE: 68 BPM | TEMPERATURE: 97.9 F

## 2025-05-08 DIAGNOSIS — G47.33 OSA (OBSTRUCTIVE SLEEP APNEA): Primary | ICD-10-CM

## 2025-05-08 DIAGNOSIS — G47.8 SLEEP PARALYSIS: ICD-10-CM

## 2025-05-08 DIAGNOSIS — G47.00 INSOMNIA, UNSPECIFIED TYPE: ICD-10-CM

## 2025-05-08 PROCEDURE — 1036F TOBACCO NON-USER: CPT | Performed by: STUDENT IN AN ORGANIZED HEALTH CARE EDUCATION/TRAINING PROGRAM

## 2025-05-08 PROCEDURE — 99214 OFFICE O/P EST MOD 30 MIN: CPT | Performed by: STUDENT IN AN ORGANIZED HEALTH CARE EDUCATION/TRAINING PROGRAM

## 2025-05-08 ASSESSMENT — SLEEP AND FATIGUE QUESTIONNAIRES
HOW LIKELY ARE YOU TO NOD OFF OR FALL ASLEEP WHILE SITTING QUIETLY AFTER LUNCH WITHOUT ALCOHOL: WOULD NEVER DOZE
WORRIED_DISTRESSED_DUE_TO_SLEEP: SOMEWHAT
HOW LIKELY ARE YOU TO NOD OFF OR FALL ASLEEP IN A CAR, WHILE STOPPED FOR A FEW MINUTES IN TRAFFIC: WOULD NEVER DOZE
SITING INACTIVE IN A PUBLIC PLACE LIKE A CLASS ROOM OR A MOVIE THEATER: WOULD NEVER DOZE
DIFFICULTY_FALLING_ASLEEP: MILD
HOW LIKELY ARE YOU TO NOD OFF OR FALL ASLEEP WHILE WATCHING TV: SLIGHT CHANCE OF DOZING
WAKING_TOO_EARLY: MODERATE
HOW LIKELY ARE YOU TO NOD OFF OR FALL ASLEEP WHILE SITTING AND READING: SLIGHT CHANCE OF DOZING
HOW LIKELY ARE YOU TO NOD OFF OR FALL ASLEEP WHILE SITTING AND TALKING TO SOMEONE: WOULD NEVER DOZE
ESS-CHAD TOTAL SCORE: 3
SLEEP_PROBLEM_NOTICEABLE_TO_OTHERS: NOT AT ALL NOTICEABLE
HOW LIKELY ARE YOU TO NOD OFF OR FALL ASLEEP WHEN YOU ARE A PASSENGER IN A CAR FOR AN HOUR WITHOUT A BREAK: WOULD NEVER DOZE
SATISFACTION_WITH_CURRENT_SLEEP_PATTERN: DISSATISFIED
SLEEP_PROBLEM_INTERFERES_DAILY_ACTIVITIES: A LITTLE
HOW LIKELY ARE YOU TO NOD OFF OR FALL ASLEEP WHILE LYING DOWN TO REST IN THE AFTERNOON WHEN CIRCUMSTANCES PERMIT: SLIGHT CHANCE OF DOZING
DIFFICULTY_STAYING_ASLEEP: SEVERE

## 2025-05-08 ASSESSMENT — PAIN SCALES - GENERAL: PAINLEVEL_OUTOF10: 0-NO PAIN

## 2025-05-08 NOTE — PROGRESS NOTES
Patient: Tyra Hogan    [id number]  : 1965 -- AGE 59 y.o.    Provider: Man Callaway MD     Location [facility]   Service Date: 2025              [facility]  Followup Visit Note     Assessment/Plan   Ms. Hogan is a 59 y.o. female and she returns in followup to the [facility] for the problems listed below on 25   Problem List, Orders, Assessment, Recommendations:  Problem List Items Addressed This Visit           ICD-10-CM    ANGELA (obstructive sleep apnea) - Primary G47.33    - doing well with PAP therapy  - detailed download reviewed and documented below.  The download was discussed with patient.  The issues and benefits from CPAP are also documented in chart  - continue current setting  - renew PAP supply orders           Relevant Orders    Follow Up In Adult Sleep Medicine    Positive Airway Pressure (PAP) Therapy    RESOLVED: Sleep paralysis G47.8    Resolved         RESOLVED: Insomnia G47.00    Down to about once a night at most if even.          Disposition  Return to clinic in 12 months          HISTORY OF PRESENT ILLNESS     HISTORY OF PRESENT ILLNESS   Tyra Hogan is a 59 y.o. female with history of ANGELA presents to [facility] for followup.     Assessment and plan from last visit: 2024  Ms. Hogan is a 59 y.o. female and she returns in follow-up to the [facility] for ANGELA and insomnia.  Doing well with CPAP other than excessive leak.  Mask troubleshooting today and she will try F&P Eson 2 Medium NM, new script today. She will call [facility] about her broken water tank.  Humidity and climate control optimized for dry mouth.  She can continue to use mouth tape if needed. For sleep onset insomnia, can stop melatonin if not seeing benefits.  Discussed working on listening to physiological cues that she is sleepy and to only spend time in bed sleeping. For  sleep maintenance insomnia, given that her triggers are unchangeable environmental issues (dog), will focus on only returning to bed when sleepy and doing a quiet activity until tired.     Problem List, Orders, Assessment, Recommendations:  Problem List Items Addressed This Visit               ICD-10-CM     ANGELA (obstructive sleep apnea) - Primary G47.33     Insomnia G47.00      #ANGELA  -continue APAP 6-12 via [facility]  -fitted for F&P Eson 2 medium today, script sent  -optimized humidity and climate control for dry mouth, can use mouth tape if she wants  -pt to call [facility] for new water chamber     #Insomnia  -Encouraged patient to not be in bed until sleepy both at beginning of night and during awakenings  -Continue current sleep hygiene  -Ok to stop melatonin if not helping.     Disposition  Return to clinic in 6 months    Current History    On today's visit, the patient reports that she has been doing well with PAP therapy without any issues.  She likes her mask and is using therapy consistently.  Her symptoms are 80% improved.  She has some questions regarding her FitBit data and they were adequately answered.  She is overall satisfied with her sleep.    PAP Info  [employer] COMPANY: [employer]  Issues with PAP Therapy?: None  Perceived Benefits of PAP therapy: refreshing sleep    PAP Adherence  PAP Adherence : A PAP adherence download was obtained and data was reviewed personally today in clinic., Screenshot of PAP download is attached below    Daytime Symptoms  Patient reports: no daytime symptoms  Patient denies: excessive daytime sleepiness  Napping habit: Patient denies taking naps.  Fatigue concerns: Patient denies feeling fatigue    ESS: 3  SABINA: 12  FOSQ: 35    PHYSICAL EXAM     VITAL SIGNS: /71 (BP Location: Right arm, Patient Position: Sitting, BP Cuff Size: Adult)   Pulse 68   Temp 36.6 °C (97.9 °F) (Temporal)   Resp 18   Wt 64 kg (141 lb)   SpO2 100%   BMI 23.46 kg/m²      PREVIOUS  WEIGHTS:  Wt Readings from Last 3 Encounters:   05/08/25 64 kg (141 lb)   11/07/24 64.4 kg (142 lb)   08/22/24 64 kg (141 lb)

## 2025-05-08 NOTE — ASSESSMENT & PLAN NOTE
- doing well with PAP therapy  - detailed download reviewed and documented below.  The download was discussed with patient.  The issues and benefits from CPAP are also documented in chart  - continue current setting  - renew PAP supply orders

## 2025-07-31 ENCOUNTER — APPOINTMENT (OUTPATIENT)
Dept: OBSTETRICS AND GYNECOLOGY | Facility: CLINIC | Age: 60
End: 2025-07-31
Payer: MEDICAID